# Patient Record
Sex: FEMALE | NOT HISPANIC OR LATINO
[De-identification: names, ages, dates, MRNs, and addresses within clinical notes are randomized per-mention and may not be internally consistent; named-entity substitution may affect disease eponyms.]

---

## 2022-07-29 PROBLEM — Z00.00 ENCOUNTER FOR PREVENTIVE HEALTH EXAMINATION: Status: ACTIVE | Noted: 2022-07-29

## 2022-08-05 ENCOUNTER — APPOINTMENT (OUTPATIENT)
Dept: RADIOLOGY | Facility: CLINIC | Age: 72
End: 2022-08-05

## 2022-08-05 ENCOUNTER — OUTPATIENT (OUTPATIENT)
Dept: OUTPATIENT SERVICES | Facility: HOSPITAL | Age: 72
LOS: 1 days | End: 2022-08-05
Payer: MEDICARE

## 2022-08-05 ENCOUNTER — APPOINTMENT (OUTPATIENT)
Dept: NEUROLOGY | Facility: CLINIC | Age: 72
End: 2022-08-05

## 2022-08-05 VITALS — BODY MASS INDEX: 22.36 KG/M2 | HEIGHT: 58 IN

## 2022-08-05 VITALS
DIASTOLIC BLOOD PRESSURE: 78 MMHG | BODY MASS INDEX: 24.76 KG/M2 | RESPIRATION RATE: 14 BRPM | HEART RATE: 59 BPM | SYSTOLIC BLOOD PRESSURE: 140 MMHG | WEIGHT: 107 LBS | HEIGHT: 55 IN

## 2022-08-05 DIAGNOSIS — M54.2 CERVICALGIA: ICD-10-CM

## 2022-08-05 PROCEDURE — 99203 OFFICE O/P NEW LOW 30 MIN: CPT

## 2022-08-05 PROCEDURE — 72050 X-RAY EXAM NECK SPINE 4/5VWS: CPT

## 2022-08-05 PROCEDURE — 72050 X-RAY EXAM NECK SPINE 4/5VWS: CPT | Mod: 26

## 2022-08-06 NOTE — ASSESSMENT
[FreeTextEntry1] : She will obtain cervical spine x-ray series and start physical therapy program.  She will return for follow-up in 3 months.

## 2022-08-06 NOTE — HISTORY OF PRESENT ILLNESS
[FreeTextEntry1] : 72-year-old woman complaining for several months of posterior neck pain with radiation to shoulders.  Denies any precipitating injuries.\par \par She has a history of hearing loss.  She had a hearing aid which "broke".

## 2022-08-06 NOTE — PHYSICAL EXAM
[FreeTextEntry1] : She is alert and oriented.  No aphasia.  Visual fields are full to confrontation.  Pupils equal and constrict to light.  Extraocular movements intact.  He is unable to hear finger rub in either ear.  Abarca test lateralizes to the left ear.  Air conduction is greater than bone conduction in both ears.  She has some tenderness to palpation of the posterior neck region.  There is no focal weakness of the limbs.  No sensory loss.  Tendon reflexes are all active and symmetric and plantar responses are flexor.  Her gait is steady.

## 2025-06-18 ENCOUNTER — APPOINTMENT (OUTPATIENT)
Dept: OTOLARYNGOLOGY | Facility: CLINIC | Age: 75
End: 2025-06-18
Payer: MEDICARE

## 2025-06-18 ENCOUNTER — NON-APPOINTMENT (OUTPATIENT)
Age: 75
End: 2025-06-18

## 2025-06-18 VITALS
WEIGHT: 106 LBS | BODY MASS INDEX: 22.25 KG/M2 | HEIGHT: 58 IN | DIASTOLIC BLOOD PRESSURE: 81 MMHG | SYSTOLIC BLOOD PRESSURE: 159 MMHG

## 2025-06-18 PROCEDURE — 99203 OFFICE O/P NEW LOW 30 MIN: CPT
